# Patient Record
Sex: MALE | Race: ASIAN | NOT HISPANIC OR LATINO | URBAN - METROPOLITAN AREA
[De-identification: names, ages, dates, MRNs, and addresses within clinical notes are randomized per-mention and may not be internally consistent; named-entity substitution may affect disease eponyms.]

---

## 2017-08-07 PROBLEM — Z00.00 ENCOUNTER FOR PREVENTIVE HEALTH EXAMINATION: Status: ACTIVE | Noted: 2017-08-07

## 2017-08-09 ENCOUNTER — OUTPATIENT (OUTPATIENT)
Dept: OUTPATIENT SERVICES | Facility: HOSPITAL | Age: 72
LOS: 1 days | End: 2017-08-09
Payer: COMMERCIAL

## 2017-08-09 ENCOUNTER — RESULT REVIEW (OUTPATIENT)
Age: 72
End: 2017-08-09

## 2017-08-09 ENCOUNTER — APPOINTMENT (OUTPATIENT)
Dept: ULTRASOUND IMAGING | Facility: CLINIC | Age: 72
End: 2017-08-09
Payer: COMMERCIAL

## 2017-08-09 DIAGNOSIS — M25.469 EFFUSION, UNSPECIFIED KNEE: ICD-10-CM

## 2017-08-09 PROCEDURE — 20611 DRAIN/INJ JOINT/BURSA W/US: CPT

## 2017-08-09 PROCEDURE — 20611 DRAIN/INJ JOINT/BURSA W/US: CPT | Mod: RT

## 2017-09-13 ENCOUNTER — APPOINTMENT (OUTPATIENT)
Dept: SPINE | Facility: CLINIC | Age: 72
End: 2017-09-13
Payer: COMMERCIAL

## 2017-09-13 VITALS
WEIGHT: 160 LBS | OXYGEN SATURATION: 96 % | HEART RATE: 67 BPM | HEIGHT: 70 IN | SYSTOLIC BLOOD PRESSURE: 130 MMHG | BODY MASS INDEX: 22.9 KG/M2 | DIASTOLIC BLOOD PRESSURE: 71 MMHG

## 2017-09-13 DIAGNOSIS — Z86.39 PERSONAL HISTORY OF OTHER ENDOCRINE, NUTRITIONAL AND METABOLIC DISEASE: ICD-10-CM

## 2017-09-13 DIAGNOSIS — Z87.39 PERSONAL HISTORY OF OTHER DISEASES OF THE MUSCULOSKELETAL SYSTEM AND CONNECTIVE TISSUE: ICD-10-CM

## 2017-09-13 DIAGNOSIS — Z83.3 FAMILY HISTORY OF DIABETES MELLITUS: ICD-10-CM

## 2017-09-13 DIAGNOSIS — Z86.69 PERSONAL HISTORY OF OTHER DISEASES OF THE NERVOUS SYSTEM AND SENSE ORGANS: ICD-10-CM

## 2017-09-13 DIAGNOSIS — Z78.9 OTHER SPECIFIED HEALTH STATUS: ICD-10-CM

## 2017-09-13 DIAGNOSIS — Z82.5 FAMILY HISTORY OF ASTHMA AND OTHER CHRONIC LOWER RESPIRATORY DISEASES: ICD-10-CM

## 2017-09-13 PROCEDURE — 99204 OFFICE O/P NEW MOD 45 MIN: CPT

## 2017-09-15 PROBLEM — Z86.39 HISTORY OF HYPERCHOLESTEROLEMIA: Status: RESOLVED | Noted: 2017-09-15 | Resolved: 2017-09-15

## 2017-09-15 PROBLEM — Z86.69 HISTORY OF GLAUCOMA: Status: RESOLVED | Noted: 2017-09-15 | Resolved: 2017-09-15

## 2017-09-15 PROBLEM — Z86.39 HISTORY OF DIABETES MELLITUS: Status: RESOLVED | Noted: 2017-09-13 | Resolved: 2017-09-15

## 2017-09-15 PROBLEM — Z87.39 HISTORY OF ARTHRITIS: Status: RESOLVED | Noted: 2017-09-13 | Resolved: 2017-09-15

## 2017-09-15 PROBLEM — Z83.3 FAMILY HISTORY OF DIABETES MELLITUS: Status: ACTIVE | Noted: 2017-09-13

## 2017-09-15 PROBLEM — Z82.5 FAMILY HISTORY OF ASTHMA: Status: ACTIVE | Noted: 2017-09-13

## 2017-09-15 PROBLEM — Z78.9 DOES NOT USE ILLICIT DRUGS: Status: ACTIVE | Noted: 2017-09-13

## 2017-09-15 RX ORDER — METFORMIN HYDROCHLORIDE 500 MG/1
500 TABLET, COATED ORAL
Refills: 0 | Status: ACTIVE | COMMUNITY

## 2017-09-15 RX ORDER — INSULIN ASPART 100 [IU]/ML
100 INJECTION, SOLUTION INTRAVENOUS; SUBCUTANEOUS
Refills: 0 | Status: ACTIVE | COMMUNITY

## 2017-09-15 RX ORDER — LATANOPROST/PF 0.005 %
0.01 DROPS OPHTHALMIC (EYE)
Refills: 0 | Status: ACTIVE | COMMUNITY

## 2017-09-15 RX ORDER — INSULIN DETEMIR 100 [IU]/ML
100 INJECTION, SOLUTION SUBCUTANEOUS
Refills: 0 | Status: ACTIVE | COMMUNITY

## 2017-10-05 ENCOUNTER — APPOINTMENT (OUTPATIENT)
Dept: ORTHOPEDIC SURGERY | Facility: CLINIC | Age: 72
End: 2017-10-05
Payer: COMMERCIAL

## 2017-10-05 VITALS — HEIGHT: 71 IN | WEIGHT: 165 LBS | BODY MASS INDEX: 23.1 KG/M2

## 2017-10-05 VITALS — HEART RATE: 71 BPM | DIASTOLIC BLOOD PRESSURE: 73 MMHG | SYSTOLIC BLOOD PRESSURE: 151 MMHG

## 2017-10-05 PROCEDURE — 99203 OFFICE O/P NEW LOW 30 MIN: CPT

## 2017-10-05 PROCEDURE — 73564 X-RAY EXAM KNEE 4 OR MORE: CPT | Mod: LT

## 2017-11-27 ENCOUNTER — APPOINTMENT (OUTPATIENT)
Dept: ORTHOPEDIC SURGERY | Facility: CLINIC | Age: 72
End: 2017-11-27
Payer: MEDICARE

## 2017-11-27 VITALS
BODY MASS INDEX: 23.1 KG/M2 | WEIGHT: 165 LBS | SYSTOLIC BLOOD PRESSURE: 176 MMHG | HEART RATE: 76 BPM | HEIGHT: 71 IN | DIASTOLIC BLOOD PRESSURE: 71 MMHG

## 2017-11-27 DIAGNOSIS — M17.11 UNILATERAL PRIMARY OSTEOARTHRITIS, RIGHT KNEE: ICD-10-CM

## 2017-11-27 PROCEDURE — 99213 OFFICE O/P EST LOW 20 MIN: CPT

## 2018-01-10 ENCOUNTER — APPOINTMENT (OUTPATIENT)
Dept: ORTHOPEDIC SURGERY | Facility: CLINIC | Age: 73
End: 2018-01-10
Payer: MEDICARE

## 2018-01-10 VITALS — DIASTOLIC BLOOD PRESSURE: 78 MMHG | HEART RATE: 65 BPM | SYSTOLIC BLOOD PRESSURE: 163 MMHG

## 2018-01-10 PROCEDURE — 73030 X-RAY EXAM OF SHOULDER: CPT | Mod: LT

## 2018-01-10 PROCEDURE — 99214 OFFICE O/P EST MOD 30 MIN: CPT

## 2018-01-30 ENCOUNTER — FORM ENCOUNTER (OUTPATIENT)
Age: 73
End: 2018-01-30

## 2018-01-31 ENCOUNTER — OUTPATIENT (OUTPATIENT)
Dept: OUTPATIENT SERVICES | Facility: HOSPITAL | Age: 73
LOS: 1 days | End: 2018-01-31
Payer: COMMERCIAL

## 2018-01-31 ENCOUNTER — APPOINTMENT (OUTPATIENT)
Dept: SPINE | Facility: CLINIC | Age: 73
End: 2018-01-31
Payer: MEDICARE

## 2018-01-31 VITALS
BODY MASS INDEX: 22.54 KG/M2 | WEIGHT: 161 LBS | DIASTOLIC BLOOD PRESSURE: 77 MMHG | HEART RATE: 75 BPM | SYSTOLIC BLOOD PRESSURE: 163 MMHG | HEIGHT: 71 IN | OXYGEN SATURATION: 98 %

## 2018-01-31 DIAGNOSIS — Z87.39 PERSONAL HISTORY OF OTHER DISEASES OF THE MUSCULOSKELETAL SYSTEM AND CONNECTIVE TISSUE: ICD-10-CM

## 2018-01-31 PROCEDURE — 72110 X-RAY EXAM L-2 SPINE 4/>VWS: CPT

## 2018-01-31 PROCEDURE — 99214 OFFICE O/P EST MOD 30 MIN: CPT

## 2018-01-31 PROCEDURE — 72082 X-RAY EXAM ENTIRE SPI 2/3 VW: CPT

## 2018-01-31 PROCEDURE — 72084 X-RAY EXAM ENTIRE SPI 6/> VW: CPT | Mod: 26

## 2018-02-02 PROBLEM — Z87.39 HISTORY OF SPINAL STENOSIS: Status: RESOLVED | Noted: 2017-10-05 | Resolved: 2018-02-02

## 2018-02-02 PROBLEM — Z87.39 HISTORY OF RHEUMATOID ARTHRITIS: Status: RESOLVED | Noted: 2017-10-05 | Resolved: 2018-02-02

## 2018-02-14 ENCOUNTER — APPOINTMENT (OUTPATIENT)
Dept: MRI IMAGING | Facility: HOSPITAL | Age: 73
End: 2018-02-14
Payer: COMMERCIAL

## 2018-02-14 ENCOUNTER — OUTPATIENT (OUTPATIENT)
Dept: OUTPATIENT SERVICES | Facility: HOSPITAL | Age: 73
LOS: 1 days | End: 2018-02-14
Payer: MEDICARE

## 2018-02-14 DIAGNOSIS — M25.519 PAIN IN UNSPECIFIED SHOULDER: ICD-10-CM

## 2018-02-14 PROCEDURE — 73221 MRI JOINT UPR EXTREM W/O DYE: CPT

## 2018-02-14 PROCEDURE — 73221 MRI JOINT UPR EXTREM W/O DYE: CPT | Mod: 26,LT

## 2018-02-23 ENCOUNTER — APPOINTMENT (OUTPATIENT)
Dept: ORTHOPEDIC SURGERY | Facility: CLINIC | Age: 73
End: 2018-02-23
Payer: MEDICARE

## 2018-02-23 ENCOUNTER — APPOINTMENT (OUTPATIENT)
Dept: VASCULAR SURGERY | Facility: CLINIC | Age: 73
End: 2018-02-23

## 2018-02-23 DIAGNOSIS — Z82.62 FAMILY HISTORY OF OSTEOPOROSIS: ICD-10-CM

## 2018-02-23 PROCEDURE — 99213 OFFICE O/P EST LOW 20 MIN: CPT | Mod: 25

## 2018-02-23 PROCEDURE — 20610 DRAIN/INJ JOINT/BURSA W/O US: CPT | Mod: LT

## 2018-04-10 ENCOUNTER — MOBILE ON CALL (OUTPATIENT)
Age: 73
End: 2018-04-10

## 2018-08-13 ENCOUNTER — APPOINTMENT (OUTPATIENT)
Dept: ORTHOPEDIC SURGERY | Facility: CLINIC | Age: 73
End: 2018-08-13
Payer: MEDICARE

## 2018-08-13 DIAGNOSIS — M77.12 LATERAL EPICONDYLITIS, LEFT ELBOW: ICD-10-CM

## 2018-08-13 DIAGNOSIS — M17.12 UNILATERAL PRIMARY OSTEOARTHRITIS, LEFT KNEE: ICD-10-CM

## 2018-08-13 PROCEDURE — 99214 OFFICE O/P EST MOD 30 MIN: CPT

## 2018-08-13 PROCEDURE — 73564 X-RAY EXAM KNEE 4 OR MORE: CPT | Mod: LT

## 2018-10-01 ENCOUNTER — APPOINTMENT (OUTPATIENT)
Dept: ORTHOPEDIC SURGERY | Facility: CLINIC | Age: 73
End: 2018-10-01
Payer: MEDICARE

## 2018-10-01 PROCEDURE — 99213 OFFICE O/P EST LOW 20 MIN: CPT

## 2018-10-22 ENCOUNTER — APPOINTMENT (OUTPATIENT)
Dept: NEUROSURGERY | Facility: CLINIC | Age: 73
End: 2018-10-22
Payer: MEDICARE

## 2018-10-22 VITALS
HEIGHT: 71 IN | OXYGEN SATURATION: 99 % | WEIGHT: 158 LBS | HEART RATE: 69 BPM | RESPIRATION RATE: 18 BRPM | DIASTOLIC BLOOD PRESSURE: 68 MMHG | SYSTOLIC BLOOD PRESSURE: 131 MMHG | BODY MASS INDEX: 22.12 KG/M2

## 2018-10-22 DIAGNOSIS — M51.26 OTHER INTERVERTEBRAL DISC DISPLACEMENT, LUMBAR REGION: ICD-10-CM

## 2018-10-22 PROCEDURE — 99215 OFFICE O/P EST HI 40 MIN: CPT

## 2018-10-26 ENCOUNTER — APPOINTMENT (OUTPATIENT)
Dept: ORTHOPEDIC SURGERY | Facility: CLINIC | Age: 73
End: 2018-10-26
Payer: MEDICARE

## 2018-10-26 PROCEDURE — 20610 DRAIN/INJ JOINT/BURSA W/O US: CPT | Mod: LT

## 2018-10-26 PROCEDURE — 99213 OFFICE O/P EST LOW 20 MIN: CPT | Mod: 25

## 2019-05-29 ENCOUNTER — FORM ENCOUNTER (OUTPATIENT)
Age: 74
End: 2019-05-29

## 2019-05-30 ENCOUNTER — OUTPATIENT (OUTPATIENT)
Dept: OUTPATIENT SERVICES | Facility: HOSPITAL | Age: 74
LOS: 1 days | End: 2019-05-30
Payer: COMMERCIAL

## 2019-05-30 ENCOUNTER — APPOINTMENT (OUTPATIENT)
Dept: SPINE | Facility: CLINIC | Age: 74
End: 2019-05-30
Payer: MEDICARE

## 2019-05-30 VITALS
HEIGHT: 71 IN | BODY MASS INDEX: 21.28 KG/M2 | WEIGHT: 152 LBS | HEART RATE: 90 BPM | RESPIRATION RATE: 18 BRPM | DIASTOLIC BLOOD PRESSURE: 79 MMHG | OXYGEN SATURATION: 99 % | SYSTOLIC BLOOD PRESSURE: 149 MMHG

## 2019-05-30 DIAGNOSIS — M43.10 SPONDYLOLISTHESIS, SITE UNSPECIFIED: ICD-10-CM

## 2019-05-30 PROCEDURE — 99214 OFFICE O/P EST MOD 30 MIN: CPT

## 2019-05-30 PROCEDURE — 72110 X-RAY EXAM L-2 SPINE 4/>VWS: CPT

## 2019-05-30 PROCEDURE — 72084 X-RAY EXAM ENTIRE SPI 6/> VW: CPT | Mod: 26

## 2019-05-30 PROCEDURE — 72082 X-RAY EXAM ENTIRE SPI 2/3 VW: CPT

## 2019-05-30 RX ORDER — DICLOFENAC SODIUM 16.05 MG/ML
1.5 SOLUTION TOPICAL
Qty: 1 | Refills: 0 | Status: DISCONTINUED | COMMUNITY
Start: 2018-08-13 | End: 2019-05-30

## 2019-05-30 RX ORDER — DICLOFENAC SODIUM 16.05 MG/ML
1.5 SOLUTION TOPICAL
Qty: 1 | Refills: 0 | Status: DISCONTINUED | COMMUNITY
Start: 2018-01-10 | End: 2019-05-30

## 2019-05-31 ENCOUNTER — APPOINTMENT (OUTPATIENT)
Dept: ORTHOPEDIC SURGERY | Facility: CLINIC | Age: 74
End: 2019-05-31
Payer: MEDICARE

## 2019-05-31 DIAGNOSIS — M67.912 UNSPECIFIED DISORDER OF SYNOVIUM AND TENDON, LEFT SHOULDER: ICD-10-CM

## 2019-05-31 DIAGNOSIS — M75.112 INCOMPLETE ROTATOR CUFF TEAR OR RUPTURE OF LEFT SHOULDER, NOT SPECIFIED AS TRAUMATIC: ICD-10-CM

## 2019-05-31 PROCEDURE — 99213 OFFICE O/P EST LOW 20 MIN: CPT

## 2019-05-31 NOTE — PHYSICAL EXAM
[de-identified] : Physical Examination\par General: well nourished, in no acute distress, alert and oriented to person, place and time\par Psychiatric: normal mood and affect, no abnormal movements or speech patterns\par Eyes: vision intact without glasses\par Throat: no thyromegaly\par Lymph: no enlarged nodes, no lymphedema in extremity\par Respiratory: no wheezing, no shortness of breath with ambulation\par Cardiac: no cardiac leg swelling, 2+ peripheral pulses\par Neurology: normal gross sensation in extremities to light touch\par Abdomen: soft, non-tender, tympanic, no masses\par \par Musculoskeletal Examination\par Cervical spine	Full painless range of motion and negative Spurling's test\par \par Shoulder			Right			Left\par Appearance\par  Skin/Swelling/Deformity	normal			normal\par  Scapular Winging	-			-\par Range of Motion\par  Forward Flexion		170 / 170			170 / 170\par  Abduction		170 / 170			170 / 170\par  External Rotation	45			45\par  Internal Rotation		T10			T10\par  SAbd Ext Rotation	90			90\par  SAbd Int Rotation	80			80\par  Painful Arc		-			+\par  Crepitus		-			-\par Palpation\par  Clavicle		-			-\par  AC Joint		-			-\par  Post Acromion		-			-\par  Levator Scapula		-			-\par  Lateral Bursa		-			+\par  Impingement Area	-			-\par  Biceps Tendon		-			-\par  Anterior Capsule		-			-\par Strength Examination\par  Supraspinatous 		5+ / 0			5+ / +\par  Infraspinatous		5+ / 0			5+ / +\par  Subscapularis		5+ / 0			5+ / 0\par  Belly Press		5+ / 0			5+ / 0\par  Lift Off		-			-\par  Drop-Arm		-			-\par Special Examination\par  Biceps Newbury's		-			+ palm up\par  Impingement Neer	-			+\par  Impingement Hawking	-			+\par \par  Apprehension		-			-\par  Suppression		-			-\par  Anterior Subluxation	-			-\par  Posterior Subluxation	-			-\par  AC Cross-Body\par  Anterior		-			-\par  Posterior		-			-\par \par Sensation\par  Axillary		normal			normal\par  LatAntCubBrach 	normal			normal\par  Median 		normal			normal\par  Ulnar 			normal			normal\par  Radial 			normal			normal\par Motor\par  AIN 			normal			normal\par  Ulnar 			normal			normal\par  Radial 			normal			normal\par  PIN 			normal			normal\par Pulses\par  Radial			2+			2+. [id number] \par  [de-identified] : 4 views of the affected left shoulder demonstrate:\par  normal bony calcification without dislocation and no fracture\par 	Arch	2B\par 	AC Joint	no to mild DJD\par 	GH Joint	none DJD\par 	Calcifications	none\par \par \par MRI left shoulder from [facility] on February 14, 2018\par My impression of the images:\par Quality of the MRI is okay\par Supraspinatous Tendon high-grade tendinosis with a focal high-grade partial tear of the anterior most aspect of the supraspinatus tendon\par Infraspinatous Tendon high-grade tendinosis\par Subscapularis Tendon ok\par Teres Minor Tendon ok\par Muscle Belly Atrophy none\par Biceps Tendon is in the groove and looks okay intra-articularly but poorly visualized with a stable attachment anchor\par Superior Labrum possible tear\par Anterior Labrum ok\par Posterior Labrum ok\par AC joint edema in the bone and joint\par There is no full thickness chondral lesion of the glenoid and humeral head\par \par The Final Radiologist Impression:\par High-grade articular surface tearing at the anterior insertional fibers of the supraspinatus spanning 4 mm in the anterior-posterior dimension with a gap of less than 1 mm in the transverse dimension.\par Tearing of the anterior and inferior labrum\par Thickening of the glenohumeral joint capsule suggestive chronic adhesive capsulitis\par Moderate to severe a.c. joint arthrosis\par \par \par 5 views of the affected left knee (standing AP, flexing standing AP, 30degree flexed lateral, 0degree lateral, sunrise view) \par  demonstrate:\par There is mild to moderate medial weightbearing aasymmetric arrowing\par Traced small osteophytic lipping\par Trace suprapatellar effusion\par Trace patellofemoral joint space loss without evidence of tilt or subluxation on sunrise view\par Normal soft tissue density\par Otherwise normal osseous bone structure without fracture or dislocation. \par

## 2019-05-31 NOTE — HISTORY OF PRESENT ILLNESS
[de-identified] : CC left shoulder\par \par HPI 74 yo-year-old right HD presents for FU of chronic onset of one year of intermittent pain in the lateral and posterior left shoulder not related to an injury. The pain is worse, and rated a 6 out of 10, described as toll aching throbbing burning, radiation to the elbow. Nothing makes the pain better and overhead activity makes the pain worse. The patient reports associated symptoms of weakness. The patient + pain at night affecting sleep, - neck pain, and - similar pain previously.\par \par The patient has tried the following treatments:\par Activity modification	+\par Ice			+\par Nsaids			+\par Physical Therapy		+ mild help in past. didn’t go recently\par Cortisone Injection		+ reports multiple CSI by many doctors without complication, last CSI mild improvement for 3 months\par Arthroscopy/Surgery	-\par \par \par reports shoulder pain worsening, CSI mild improvement for 3 months\par \par \par Review of Systems is positive for the above musculoskeletal symptoms and is otherwise non-contributory for general, constitutional, psychiatric, neurologic, HEENT, cardiac, respiratory, gastrointestinal, reproductive, lymphatic, and dermatologic complaints.

## 2019-05-31 NOTE — DISCUSSION/SUMMARY
[de-identified] : left partial rotator cuff tear\par left knee mild osteoarthritis\par left tennis elbow\par \par \par We discussed at length the anatomy, function and tear pattern of the rotator cuff using models, diagrams and drawings. We reviewed the patient's physical exam, radiographic images and history. We discussed the expected outcome of non-operative conservative treatment versus arthroscopic operative rotator cuff repair. Non-operative management consists of patient education, activity modification, corticosteroid injection, PO or topical NSAIDs, and formal physical therapy. Partial thickness tears do have some limited healing potential but infrequently heal completely and with time sometimes progress towards a focal full thickness tear. Smaller tears without retraction are expected to progress and enlarge overtime to larger full thickness tears with retraction. Full thickness rotator cuff tears, in a vast majority of circumstances, do not heal without surgical treatment. The larger the tear becomes, the more difficult the repair is technically and protracts and slows rehabilitation. As the full thickness rotator cuff tear progresses over time, the torn tendon edge retracted due to intrinsic tendon changes to its strength and elasticity, which along with progressive rotator cuff muscle belly atrophy and fatty degeneration makes surgical management both less effective and more difficult. Given enough chronic tendon changes and degenerative muscle changes, the cuff may become unrepairable, necessitating larger, more costly, less predictable poorer outcome, reconstructive arthroscopic or open surgeries including a reverse shoulder replacement.\par \par patient not amenable to surgery.\par \par still hasn't done PT, rec PT\par \par no csi given only mild short term improvement\par \par FU after PT. \par

## 2019-06-10 ENCOUNTER — APPOINTMENT (OUTPATIENT)
Dept: ORTHOPEDIC SURGERY | Facility: CLINIC | Age: 74
End: 2019-06-10

## 2019-06-26 ENCOUNTER — APPOINTMENT (OUTPATIENT)
Dept: SPINE | Facility: CLINIC | Age: 74
End: 2019-06-26
Payer: MEDICARE

## 2019-06-26 VITALS
SYSTOLIC BLOOD PRESSURE: 139 MMHG | DIASTOLIC BLOOD PRESSURE: 75 MMHG | HEART RATE: 80 BPM | BODY MASS INDEX: 21.28 KG/M2 | OXYGEN SATURATION: 97 % | RESPIRATION RATE: 18 BRPM | WEIGHT: 152 LBS | HEIGHT: 71 IN

## 2019-06-26 DIAGNOSIS — M54.5 LOW BACK PAIN: ICD-10-CM

## 2019-06-26 DIAGNOSIS — M48.061 SPINAL STENOSIS, LUMBAR REGION WITHOUT NEUROGENIC CLAUDICATION: ICD-10-CM

## 2019-06-26 DIAGNOSIS — M43.10 SPONDYLOLISTHESIS, SITE UNSPECIFIED: ICD-10-CM

## 2019-06-26 DIAGNOSIS — M99.83 OTHER BIOMECHANICAL LESIONS OF LUMBAR REGION: ICD-10-CM

## 2019-06-26 DIAGNOSIS — M43.16 SPONDYLOLISTHESIS, LUMBAR REGION: ICD-10-CM

## 2019-06-26 PROCEDURE — 99215 OFFICE O/P EST HI 40 MIN: CPT

## 2019-06-27 PROBLEM — M43.16 SPONDYLOLISTHESIS OF LUMBAR REGION: Status: ACTIVE | Noted: 2019-06-27

## 2019-06-27 PROBLEM — M43.10 SPONDYLOLISTHESIS: Status: ACTIVE | Noted: 2019-06-27

## 2021-07-09 ENCOUNTER — TRANSCRIPTION ENCOUNTER (OUTPATIENT)
Age: 76
End: 2021-07-09

## 2021-07-09 ENCOUNTER — APPOINTMENT (OUTPATIENT)
Dept: VASCULAR SURGERY | Facility: CLINIC | Age: 76
End: 2021-07-09
Payer: MEDICARE

## 2021-07-09 VITALS — DIASTOLIC BLOOD PRESSURE: 72 MMHG | SYSTOLIC BLOOD PRESSURE: 132 MMHG | HEART RATE: 66 BPM

## 2021-07-09 PROCEDURE — 99203 OFFICE O/P NEW LOW 30 MIN: CPT

## 2021-07-09 RX ORDER — UBIDECARENONE/VIT E ACET 100MG-5
CAPSULE ORAL
Refills: 0 | Status: ACTIVE | COMMUNITY

## 2021-07-09 RX ORDER — FLUTICASONE PROPIONATE 50 UG/1
50 SPRAY, METERED NASAL
Refills: 0 | Status: DISCONTINUED | COMMUNITY
End: 2021-07-09

## 2021-07-09 RX ORDER — SIMVASTATIN 20 MG/1
20 TABLET, FILM COATED ORAL
Refills: 0 | Status: DISCONTINUED | COMMUNITY
End: 2021-07-09

## 2021-07-09 RX ORDER — GLIPIZIDE 5 MG/1
5 TABLET ORAL
Refills: 0 | Status: DISCONTINUED | COMMUNITY
End: 2021-07-09

## 2021-07-09 RX ORDER — CALCITONIN SALMON 2200 [IU]/ML
200 SPRAY, METERED NASAL
Refills: 0 | Status: DISCONTINUED | COMMUNITY
End: 2021-07-09

## 2021-07-09 RX ORDER — LISINOPRIL 20 MG/1
20 TABLET ORAL
Refills: 0 | Status: ACTIVE | COMMUNITY

## 2021-07-09 RX ORDER — QUINAPRIL HYDROCHLORIDE 20 MG/1
20 TABLET, FILM COATED ORAL
Refills: 0 | Status: DISCONTINUED | COMMUNITY
End: 2021-07-09

## 2021-07-09 RX ORDER — AMLODIPINE BESYLATE 2.5 MG/1
2.5 TABLET ORAL
Refills: 0 | Status: ACTIVE | COMMUNITY

## 2021-07-09 RX ORDER — ALENDRONATE SODIUM 70 MG/1
70 TABLET ORAL
Refills: 0 | Status: ACTIVE | COMMUNITY

## 2021-07-09 RX ORDER — ATORVASTATIN CALCIUM 40 MG/1
40 TABLET, FILM COATED ORAL
Refills: 0 | Status: ACTIVE | COMMUNITY

## 2021-07-09 NOTE — ASSESSMENT
[FreeTextEntry1] : 75 yo male with history of htn, hld, dm, spinal stenosis with chronic back pain presents for evaluation of "circulation issues" referred by podiatry after arterial duplex shows plaque in the pop artery.\par \par pt with strongly palpable pulses of b/l lower extremities \par no open wounds or rest pain \par no vascular surgical intervention \par pt to follow up as needed

## 2021-07-09 NOTE — HISTORY OF PRESENT ILLNESS
[FreeTextEntry1] : 75 yo male with history of htn, hld, dm, spinal stenosis with chronic back pain presents for evaluation of "circulation issues" refereed by podiatry after arterial duplex shows plaque in the pop artery.  pt states that his walking is limited 2/2 back and knee pain and is able to walk about 2-3 blocks prior to having to stop.  pt states that he had a lower extremity wound about 4 years ago that healed with local wound care and denies any recurrent wounds since then.  pt denies any pain in the feet while resting

## 2021-07-09 NOTE — CONSULT LETTER
[Dear  ___] : Dear  [unfilled], [Consult Letter:] : I had the pleasure of evaluating your patient, [unfilled]. [Please see my note below.] : Please see my note below. [Consult Closing:] : Thank you very much for allowing me to participate in the care of this patient.  If you have any questions, please do not hesitate to contact me. [Sincerely,] : Sincerely, [FreeTextEntry3] : Shorty Perry M.D., F.EMMAS., R.P.WENDY.I.\par  of Vascular Surgery\par Assistant Professor of Radiology\par Director of Endovascular Program/ [facility] Center\par [facility]

## 2021-07-09 NOTE — PHYSICAL EXAM
[Abdominal Aorta] : Normal abdominal aorta [2+] : left 2+ [No Rash or Lesion] : No rash or lesion [Alert] : alert [Calm] : calm [JVD] : no jugular venous distention  [Normal Breath Sounds] : Normal breath sounds [Normal Heart Sounds] : normal heart sounds [Ankle Swelling (On Exam)] : not present [Varicose Veins Of Lower Extremities] : not present [] : not present [Abdomen Masses] : No abdominal masses [Skin Ulcer] : no ulcer [de-identified] : appears well  [de-identified] : sensation intact b/l lower extremities

## 2021-12-12 ENCOUNTER — APPOINTMENT (OUTPATIENT)
Dept: DISASTER EMERGENCY | Facility: CLINIC | Age: 76
End: 2021-12-12

## 2021-12-12 DIAGNOSIS — Z01.818 ENCOUNTER FOR OTHER PREPROCEDURAL EXAMINATION: ICD-10-CM

## 2021-12-13 ENCOUNTER — OUTPATIENT (OUTPATIENT)
Dept: OUTPATIENT SERVICES | Facility: HOSPITAL | Age: 76
LOS: 1 days | End: 2021-12-13
Payer: COMMERCIAL

## 2021-12-13 VITALS
RESPIRATION RATE: 18 BRPM | OXYGEN SATURATION: 97 % | WEIGHT: 158.95 LBS | HEART RATE: 72 BPM | DIASTOLIC BLOOD PRESSURE: 77 MMHG | SYSTOLIC BLOOD PRESSURE: 164 MMHG | HEIGHT: 69 IN | TEMPERATURE: 98 F

## 2021-12-13 DIAGNOSIS — M65.311 TRIGGER THUMB, RIGHT THUMB: ICD-10-CM

## 2021-12-13 DIAGNOSIS — I10 ESSENTIAL (PRIMARY) HYPERTENSION: ICD-10-CM

## 2021-12-13 DIAGNOSIS — E11.9 TYPE 2 DIABETES MELLITUS WITHOUT COMPLICATIONS: ICD-10-CM

## 2021-12-13 DIAGNOSIS — Z91.89 OTHER SPECIFIED PERSONAL RISK FACTORS, NOT ELSEWHERE CLASSIFIED: ICD-10-CM

## 2021-12-13 DIAGNOSIS — Z01.818 ENCOUNTER FOR OTHER PREPROCEDURAL EXAMINATION: ICD-10-CM

## 2021-12-13 LAB — SARS-COV-2 N GENE NPH QL NAA+PROBE: NOT DETECTED

## 2021-12-13 PROCEDURE — 71046 X-RAY EXAM CHEST 2 VIEWS: CPT | Mod: 26

## 2021-12-13 PROCEDURE — 71046 X-RAY EXAM CHEST 2 VIEWS: CPT

## 2021-12-13 PROCEDURE — G0463: CPT

## 2021-12-13 RX ORDER — ALENDRONATE SODIUM 70 MG/1
1 TABLET ORAL
Qty: 0 | Refills: 0 | DISCHARGE

## 2021-12-13 RX ORDER — ATORVASTATIN CALCIUM 80 MG/1
1 TABLET, FILM COATED ORAL
Qty: 0 | Refills: 0 | DISCHARGE

## 2021-12-13 RX ORDER — INSULIN DETEMIR 100/ML (3)
25 INSULIN PEN (ML) SUBCUTANEOUS
Qty: 0 | Refills: 0 | DISCHARGE

## 2021-12-13 RX ORDER — ASPIRIN/CALCIUM CARB/MAGNESIUM 324 MG
0 TABLET ORAL
Qty: 0 | Refills: 0 | DISCHARGE

## 2021-12-13 RX ORDER — LISINOPRIL 2.5 MG/1
1 TABLET ORAL
Qty: 0 | Refills: 0 | DISCHARGE

## 2021-12-13 RX ORDER — METFORMIN HYDROCHLORIDE 850 MG/1
1 TABLET ORAL
Qty: 0 | Refills: 0 | DISCHARGE

## 2021-12-13 RX ORDER — INSULIN ASPART 100 [IU]/ML
15 INJECTION, SOLUTION SUBCUTANEOUS
Qty: 0 | Refills: 0 | DISCHARGE

## 2021-12-13 RX ORDER — AMLODIPINE BESYLATE 2.5 MG/1
1 TABLET ORAL
Qty: 0 | Refills: 0 | DISCHARGE

## 2021-12-13 NOTE — H&P PST ADULT - GASTROINTESTINAL
<<-----Click on this checkbox to enter Post-Op Dx Soft, non-tender, no hepatosplenomegaly, normal bowel sounds details…

## 2021-12-13 NOTE — H&P PST ADULT - PROBLEM SELECTOR PLAN 1
Patient scheduled for right thumb tenosynovectomy on 12/15/2021  Written and oral preoperative instructions given to patient with understanding verbalized.   Instructions given to include using 4% chlorhexidine gluconate pre procedural wash day of surgery prior to hospital arrival  Maintaining NPO status post midnight day before surgery  Stopping aspirin, NSAIDs, herbs, vitamins 7days before surgery   Patient is to expect a phone call day before surgery between the hours of 430- 630pm giving arrival time for surgery day.

## 2021-12-13 NOTE — H&P PST ADULT - ACTIVITY
Daily walks, ADLs, able to walk up 3-4flights of stairs slowly w/o exertional chest pain or sob but with back pain

## 2021-12-13 NOTE — H&P PST ADULT - PROBLEM SELECTOR PLAN 3
Current treatment regimen metformin 500mg bid, Levemir 25units at HS and Novolog 15units bid. Advised to c/w regimen  Patient instructed with understanding verbalized to HOLD metformin and Novolog the day of surgery but is to take regular Levemir 25units night before surgery.  Last Hgb A1C not known, will obtain from PCP  Fingerstick STAT AM day of surgery ordered  Follow up with PCP postoperatively. Current treatment regimen Amlodipine 25mg and lisinopril 20mg daily. Advised to c/w regimen.   Patient instructed with understanding verbalized to take amlodipine and lisinopril with a sip of water the morning of surgery.   Follow up with PCP postoperatively

## 2021-12-13 NOTE — H&P PST ADULT - PROBLEM SELECTOR PLAN 4
Current treatment regimen metformin 500mg bid, Levemir 25units at HS and Novolog 15units bid. Advised to c/w regimen  Patient instructed with understanding verbalized to HOLD metformin and Novolog the day of surgery but is to take regular Levemir 25units night before surgery.  Last Hgb A1C not known, will obtain from PCP  Fingerstick STAT AM day of surgery ordered  Follow up with PCP postoperatively.

## 2021-12-13 NOTE — H&P PST ADULT - HISTORY OF PRESENT ILLNESS
76year old male with pmhx of T2DM, hypertension, hyperlipidemia, osteoporosis, osteoarthritis of knees and shoulders and lumbar stenosis presents with c/o right thumb pain x 1year that has failed to resolve with conservative management. Patient is here today for presurgical testing for scheduled right thumb tenosynovectomy on 12/15/2021

## 2021-12-13 NOTE — H&P PST ADULT - NSANTHOSAYNRD_GEN_A_CORE
No. JONAS screening performed.  STOP BANG Legend: 0-2 = LOW Risk; 3-4 = INTERMEDIATE Risk; 5-8 = HIGH Risk

## 2021-12-13 NOTE — H&P PST ADULT - NSICDXPASTMEDICALHX_GEN_ALL_CORE_FT
PAST MEDICAL HISTORY:  History of osteoporosis     Hyperlipidemia     Hypertension     Lumbar stenosis     Osteoarthritis     Type 2 diabetes mellitus

## 2021-12-13 NOTE — H&P PST ADULT - PROBLEM SELECTOR PLAN 2
Current treatment regimen Amlodipine 25mg and lisinopril 20mg daily. Advised to c/w regimen.   Patient instructed with understanding verbalized to take amlodipine and lisinopril with a sip of water the morning of surgery.   Follow up with PCP postoperatively Patient today with Stop Bang Score of 4, Intermediate risk for JONAS   Patient denies current dx of hx of JONAS  Recommend perioperative JONAS risk precautions

## 2022-12-27 ENCOUNTER — NON-APPOINTMENT (OUTPATIENT)
Age: 77
End: 2022-12-27

## 2022-12-27 PROBLEM — M48.061 SPINAL STENOSIS, LUMBAR REGION WITHOUT NEUROGENIC CLAUDICATION: Chronic | Status: ACTIVE | Noted: 2021-12-13

## 2022-12-27 PROBLEM — M19.90 UNSPECIFIED OSTEOARTHRITIS, UNSPECIFIED SITE: Chronic | Status: ACTIVE | Noted: 2021-12-13

## 2022-12-27 PROBLEM — Z87.39 PERSONAL HISTORY OF OTHER DISEASES OF THE MUSCULOSKELETAL SYSTEM AND CONNECTIVE TISSUE: Chronic | Status: ACTIVE | Noted: 2021-12-13

## 2022-12-27 PROBLEM — E11.9 TYPE 2 DIABETES MELLITUS WITHOUT COMPLICATIONS: Chronic | Status: ACTIVE | Noted: 2021-12-13

## 2022-12-27 PROBLEM — E78.5 HYPERLIPIDEMIA, UNSPECIFIED: Chronic | Status: ACTIVE | Noted: 2021-12-13

## 2022-12-27 PROBLEM — I10 ESSENTIAL (PRIMARY) HYPERTENSION: Chronic | Status: ACTIVE | Noted: 2021-12-13

## 2023-01-06 ENCOUNTER — APPOINTMENT (OUTPATIENT)
Dept: VASCULAR SURGERY | Facility: CLINIC | Age: 78
End: 2023-01-06
Payer: MEDICARE

## 2023-01-06 VITALS
TEMPERATURE: 97.5 F | HEIGHT: 71 IN | BODY MASS INDEX: 21.7 KG/M2 | HEART RATE: 80 BPM | SYSTOLIC BLOOD PRESSURE: 147 MMHG | DIASTOLIC BLOOD PRESSURE: 75 MMHG | WEIGHT: 155 LBS

## 2023-01-06 PROCEDURE — 99214 OFFICE O/P EST MOD 30 MIN: CPT

## 2023-01-06 NOTE — PHYSICAL EXAM
[JVD] : no jugular venous distention  [Normal Breath Sounds] : Normal breath sounds [Normal Heart Sounds] : normal heart sounds [Abdominal Aorta] : Normal abdominal aorta [2+] : left 2+ [Ankle Swelling (On Exam)] : not present [Varicose Veins Of Lower Extremities] : not present [] : not present [Abdomen Masses] : No abdominal masses [No Rash or Lesion] : No rash or lesion [Skin Ulcer] : no ulcer [Alert] : alert [Calm] : calm [de-identified] : appears well  [de-identified] : sensation intact b/l lower extremities

## 2023-01-06 NOTE — ASSESSMENT
[FreeTextEntry1] : 77 yo male with history of htn, hld, dm, spinal stenosis with chronic back pain presents for evaluation of "circulation issues" referred by podiatry after arterial duplex shows plaque in the pop artery.\par \par pt with strongly palpable pulses of b/l lower extremities \par We will schedule the patient for PVR with exercise to document adequate flow.  Patient continues to have symptoms and there is a concern regarding circulation.\par \par

## 2023-01-10 ENCOUNTER — TRANSCRIPTION ENCOUNTER (OUTPATIENT)
Age: 78
End: 2023-01-10

## 2023-01-11 ENCOUNTER — APPOINTMENT (OUTPATIENT)
Dept: VASCULAR SURGERY | Facility: CLINIC | Age: 78
End: 2023-01-11
Payer: MEDICARE

## 2023-01-11 PROCEDURE — 93971 EXTREMITY STUDY: CPT | Mod: LT

## 2023-01-11 PROCEDURE — 93924 LWR XTR VASC STDY BILAT: CPT

## 2023-01-23 ENCOUNTER — TRANSCRIPTION ENCOUNTER (OUTPATIENT)
Age: 78
End: 2023-01-23

## 2023-01-24 ENCOUNTER — NON-APPOINTMENT (OUTPATIENT)
Age: 78
End: 2023-01-24

## 2023-01-26 ENCOUNTER — TRANSCRIPTION ENCOUNTER (OUTPATIENT)
Age: 78
End: 2023-01-26

## 2024-01-02 ENCOUNTER — APPOINTMENT (OUTPATIENT)
Dept: ORTHOPEDIC SURGERY | Facility: CLINIC | Age: 79
End: 2024-01-02

## 2024-01-23 ENCOUNTER — APPOINTMENT (OUTPATIENT)
Dept: ORTHOPEDIC SURGERY | Facility: CLINIC | Age: 79
End: 2024-01-23
Payer: MEDICARE

## 2024-01-23 DIAGNOSIS — M65.321 TRIGGER FINGER, RIGHT INDEX FINGER: ICD-10-CM

## 2024-01-23 PROCEDURE — 99203 OFFICE O/P NEW LOW 30 MIN: CPT | Mod: 25

## 2024-01-23 PROCEDURE — 20550 NJX 1 TENDON SHEATH/LIGAMENT: CPT | Mod: RT

## 2024-01-23 PROCEDURE — 73140 X-RAY EXAM OF FINGER(S): CPT | Mod: RT

## 2024-01-23 NOTE — HISTORY OF PRESENT ILLNESS
[de-identified] : 1/23/24: 78yo RHD male presents for RIGHT index finger pain and locking x 1 year. Worse in the morning. Reports that he underwent CSI for RIGHT index trigger finger by Dr. Dieter Ramachandran in March 2023, which helped for 6 months. Denies injury.  Hx: IDDM. HTN. HLD. Lumbar stenosis. [] : no [FreeTextEntry5] : CRESCENCIO lynn [RHD] 79 year old male is here today c/o RIGHT index finger locking and pain x 1 year w/o injury. c/o increased symptoms in the AM. +CSI which wore off after 6 months.  [FreeTextEntry1] : RIGHT index finger  [de-identified] : outside provider  [de-identified] : 6 months ago

## 2024-01-23 NOTE — ASSESSMENT
[FreeTextEntry1] : The condition was explained to the patient. Discussed risks and benefits of treatment options for tenosynovitis - activity modification, NSAID, splint, steroid injection, or surgery.  Patient would like to proceed with CSI for trigger finger. - Discussed risks, benefits, and alternatives as well as contents of injection. Risks include, but are not limited allergic reaction, flare reaction, injection site pain, bruising, numbness, increased blood sugar, skin discoloration, fat atrophy, tendon rupture, and infection. Risk of immune suppression and increased susceptibility to infection with steroid use. We discussed that too many injections may lead to weakening of the tendon and tendon rupture. Patient expressed understanding and would like to proceed with injection. - The skin over the RIGHT index finger A1 pulley was cleansed with alcohol and anesthetized with ethyl chloride. The flexor sheath was injected with 3mg of celestone, 0.5cc of 1% lidocaine. Site was dressed with a band-aid. Patient tolerated the procedure well. - discussed that it may take up to 1 week for symptoms to improve after CSI.  F/u PRN.

## 2024-01-23 NOTE — IMAGING
[de-identified] : RIGHT HAND skin intact. no swelling. min TTP to IF volar palmodigital crease. good EPL, FPL. good finger extension, flex to full fist. good finger abduction and adduction.  SILT median, ulnar, radial distributions. palpable radial pulse, brisk cap refill all digits. no triggering.   XRAYS OF RIGHT INDEX FINGER: no acute displaced fracture or dislocation. djd of DIPJ.

## 2024-07-11 ENCOUNTER — NON-APPOINTMENT (OUTPATIENT)
Age: 79
End: 2024-07-11

## 2024-07-12 ENCOUNTER — APPOINTMENT (OUTPATIENT)
Age: 79
End: 2024-07-12
Payer: MEDICARE

## 2024-07-12 VITALS
DIASTOLIC BLOOD PRESSURE: 70 MMHG | OXYGEN SATURATION: 99 % | BODY MASS INDEX: 21.7 KG/M2 | HEART RATE: 82 BPM | WEIGHT: 155 LBS | HEIGHT: 71 IN | SYSTOLIC BLOOD PRESSURE: 142 MMHG

## 2024-07-12 DIAGNOSIS — M25.562 PAIN IN LEFT KNEE: ICD-10-CM

## 2024-07-12 PROCEDURE — 73562 X-RAY EXAM OF KNEE 3: CPT | Mod: LT

## 2024-07-12 PROCEDURE — 99204 OFFICE O/P NEW MOD 45 MIN: CPT | Mod: 25

## 2024-07-12 PROCEDURE — 20610 DRAIN/INJ JOINT/BURSA W/O US: CPT | Mod: LT

## 2024-07-12 RX ORDER — MELOXICAM 7.5 MG/1
7.5 TABLET ORAL
Qty: 28 | Refills: 1 | Status: ACTIVE | COMMUNITY
Start: 2024-07-12 | End: 1900-01-01

## 2024-07-12 RX ORDER — DICLOFENAC SODIUM 1% 10 MG/G
1 GEL TOPICAL DAILY
Qty: 1 | Refills: 1 | Status: ACTIVE | COMMUNITY
Start: 2024-07-12 | End: 1900-01-01

## 2024-09-20 ENCOUNTER — TRANSCRIPTION ENCOUNTER (OUTPATIENT)
Age: 79
End: 2024-09-20

## 2024-10-04 ENCOUNTER — APPOINTMENT (OUTPATIENT)
Age: 79
End: 2024-10-04
Payer: MEDICARE

## 2024-10-04 DIAGNOSIS — M25.562 PAIN IN LEFT KNEE: ICD-10-CM

## 2024-10-04 PROCEDURE — 99213 OFFICE O/P EST LOW 20 MIN: CPT

## 2024-10-04 RX ORDER — DICLOFENAC SODIUM 10 MG/G
1 GEL TOPICAL DAILY
Qty: 1 | Refills: 1 | Status: ACTIVE | COMMUNITY
Start: 2024-10-04 | End: 1900-01-01

## 2024-10-04 RX ORDER — HYLAN G-F 20 16MG/2ML
16 SYRINGE (ML) INTRAARTICULAR
Qty: 1 | Refills: 0 | Status: ACTIVE | COMMUNITY
Start: 2024-10-04 | End: 1900-01-01

## 2024-10-04 NOTE — PHYSICAL EXAM
[de-identified] : The patient is alert, cooperative, and oriented x 3. Pupils are equal and round. The patient does not have skin rash. Gait is normal.  Left knee: no redness, no clear swelling, ROM full, pain with max flexion, no clear instability, David test negative. [de-identified] : Left knee x-ray 3 views taken today show mild osteoarthritis of left knee joint. PF joint looks ok.

## 2024-10-04 NOTE — PHYSICAL EXAM
[de-identified] : The patient is alert, cooperative, and oriented x 3. Pupils are equal and round. The patient does not have skin rash. Gait is normal.  Left knee: no redness, no clear swelling, ROM full, pain with max flexion, no clear instability, David test negative. [de-identified] : Left knee x-ray 3 views taken today show mild osteoarthritis of left knee joint. PF joint looks ok.

## 2024-10-04 NOTE — CONSULT LETTER
[Dear  ___] : Dear  [unfilled], [Consult Letter:] : I had the pleasure of evaluating your patient, [unfilled]. [Please see my note below.] : Please see my note below. [Consult Closing:] : Thank you very much for allowing me to participate in the care of this patient.  If you have any questions, please do not hesitate to contact me. [Sincerely,] : Sincerely, [FreeTextEntry3] : Angie Locke MD PhD

## 2024-10-04 NOTE — HISTORY OF PRESENT ILLNESS
[de-identified] : Follow-up of left knee pain He states his left knee pain is gone after left knee joint injection at the previous visit. He also did PT many times. But left knee pain came back recently.  Below is the history of initial visit. He is 79 years old male who comes to clinic with left knee pain. He has chronic left knee pain for 7-8 years which got worsened one month ago after walking on uneven floor. No pain meds. He did PT 5 years ago. He had GEL injection 5 years ago.

## 2024-10-04 NOTE — DISCUSSION/SUMMARY
[de-identified] : I examined, evaluated, and discussed with the patient. His left knee pain was gone after left knee steroid injection. He also did PT but recently left knee pain came back.  Based on physical exam and image findings, the patient diagnosis is left knee osteoarthritis.  Treatment plan is medications PRN and PT and hyaluronic acid injection to left knee joint. Synvisc ordered. Also Meloxicam ordered.   The patient understands everything and all questions were answered. The patient will return after Synvisc approved.

## 2024-10-04 NOTE — HISTORY OF PRESENT ILLNESS
[de-identified] : Follow-up of left knee pain He states his left knee pain is gone after left knee joint injection at the previous visit. He also did PT many times. But left knee pain came back recently.  Below is the history of initial visit. He is 79 years old male who comes to clinic with left knee pain. He has chronic left knee pain for 7-8 years which got worsened one month ago after walking on uneven floor. No pain meds. He did PT 5 years ago. He had GEL injection 5 years ago.

## 2024-10-04 NOTE — DISCUSSION/SUMMARY
[de-identified] : I examined, evaluated, and discussed with the patient. His left knee pain was gone after left knee steroid injection. He also did PT but recently left knee pain came back.  Based on physical exam and image findings, the patient diagnosis is left knee osteoarthritis.  Treatment plan is medications PRN and PT and hyaluronic acid injection to left knee joint. Synvisc ordered. Also Meloxicam ordered.   The patient understands everything and all questions were answered. The patient will return after Synvisc approved.

## 2024-10-08 RX ORDER — MELOXICAM 7.5 MG/1
7.5 TABLET ORAL
Qty: 30 | Refills: 1 | Status: ACTIVE | COMMUNITY
Start: 2024-10-04 | End: 1900-01-01

## 2025-09-13 ENCOUNTER — EMERGENCY (EMERGENCY)
Facility: HOSPITAL | Age: 80
LOS: 1 days | End: 2025-09-13
Attending: STUDENT IN AN ORGANIZED HEALTH CARE EDUCATION/TRAINING PROGRAM
Payer: COMMERCIAL

## 2025-09-13 VITALS
SYSTOLIC BLOOD PRESSURE: 183 MMHG | TEMPERATURE: 98 F | RESPIRATION RATE: 16 BRPM | WEIGHT: 154.1 LBS | HEART RATE: 67 BPM | HEIGHT: 71 IN | OXYGEN SATURATION: 100 % | DIASTOLIC BLOOD PRESSURE: 77 MMHG

## 2025-09-13 PROCEDURE — 99285 EMERGENCY DEPT VISIT HI MDM: CPT

## 2025-09-13 PROCEDURE — 74177 CT ABD & PELVIS W/CONTRAST: CPT | Mod: 26

## 2025-09-13 PROCEDURE — 74177 CT ABD & PELVIS W/CONTRAST: CPT

## 2025-09-13 RX ORDER — ACETAMINOPHEN 500 MG/5ML
1000 LIQUID (ML) ORAL ONCE
Refills: 0 | Status: COMPLETED | OUTPATIENT
Start: 2025-09-13 | End: 2025-09-13

## 2025-09-13 RX ORDER — SODIUM CHLORIDE 9 G/1000ML
1000 INJECTION, SOLUTION INTRAVENOUS ONCE
Refills: 0 | Status: COMPLETED | OUTPATIENT
Start: 2025-09-13 | End: 2025-09-13

## 2025-09-13 RX ADMIN — SODIUM CHLORIDE 1000 MILLILITER(S): 9 INJECTION, SOLUTION INTRAVENOUS at 23:46

## 2025-09-13 RX ADMIN — Medication 400 MILLIGRAM(S): at 23:30

## 2025-09-14 VITALS
RESPIRATION RATE: 18 BRPM | DIASTOLIC BLOOD PRESSURE: 78 MMHG | SYSTOLIC BLOOD PRESSURE: 142 MMHG | HEART RATE: 68 BPM | OXYGEN SATURATION: 99 % | TEMPERATURE: 98 F

## 2025-09-14 LAB
ALBUMIN SERPL ELPH-MCNC: 4 G/DL — SIGNIFICANT CHANGE UP (ref 3.3–5)
ALP SERPL-CCNC: 70 U/L — SIGNIFICANT CHANGE UP (ref 40–120)
ALT FLD-CCNC: 20 U/L — SIGNIFICANT CHANGE UP (ref 10–45)
ANION GAP SERPL CALC-SCNC: 12 MMOL/L — SIGNIFICANT CHANGE UP (ref 5–17)
ANION GAP SERPL CALC-SCNC: 14 MMOL/L — SIGNIFICANT CHANGE UP (ref 5–17)
APPEARANCE UR: ABNORMAL
AST SERPL-CCNC: 27 U/L — SIGNIFICANT CHANGE UP (ref 10–40)
BACTERIA # UR AUTO: NEGATIVE /HPF — SIGNIFICANT CHANGE UP
BILIRUB SERPL-MCNC: 1.2 MG/DL — SIGNIFICANT CHANGE UP (ref 0.2–1.2)
BILIRUB UR-MCNC: NEGATIVE — SIGNIFICANT CHANGE UP
BUN SERPL-MCNC: 19 MG/DL — SIGNIFICANT CHANGE UP (ref 7–23)
BUN SERPL-MCNC: 20 MG/DL — SIGNIFICANT CHANGE UP (ref 7–23)
CA CARBONATE CRY # UR COMP ASSIST: PRESENT
CALCIUM SERPL-MCNC: 8.6 MG/DL — SIGNIFICANT CHANGE UP (ref 8.4–10.5)
CALCIUM SERPL-MCNC: 8.9 MG/DL — SIGNIFICANT CHANGE UP (ref 8.4–10.5)
CAST: 1 /LPF — SIGNIFICANT CHANGE UP (ref 0–4)
CHLORIDE SERPL-SCNC: 102 MMOL/L — SIGNIFICANT CHANGE UP (ref 96–108)
CHLORIDE SERPL-SCNC: 97 MMOL/L — SIGNIFICANT CHANGE UP (ref 96–108)
CO2 SERPL-SCNC: 21 MMOL/L — LOW (ref 22–31)
CO2 SERPL-SCNC: 23 MMOL/L — SIGNIFICANT CHANGE UP (ref 22–31)
COLOR SPEC: YELLOW — SIGNIFICANT CHANGE UP
CREAT SERPL-MCNC: 1.39 MG/DL — HIGH (ref 0.5–1.3)
CREAT SERPL-MCNC: 1.49 MG/DL — HIGH (ref 0.5–1.3)
DIFF PNL FLD: ABNORMAL
EGFR: 48 ML/MIN/1.73M2 — LOW
EGFR: 48 ML/MIN/1.73M2 — LOW
EGFR: 52 ML/MIN/1.73M2 — LOW
EGFR: 52 ML/MIN/1.73M2 — LOW
GLUCOSE SERPL-MCNC: 113 MG/DL — HIGH (ref 70–99)
GLUCOSE SERPL-MCNC: 148 MG/DL — HIGH (ref 70–99)
GLUCOSE UR QL: NEGATIVE MG/DL — SIGNIFICANT CHANGE UP
HCT VFR BLD CALC: 39.4 % — SIGNIFICANT CHANGE UP (ref 39–50)
HGB BLD-MCNC: 13 G/DL — SIGNIFICANT CHANGE UP (ref 13–17)
KETONES UR QL: NEGATIVE MG/DL — SIGNIFICANT CHANGE UP
LEUKOCYTE ESTERASE UR-ACNC: NEGATIVE — SIGNIFICANT CHANGE UP
LIDOCAIN IGE QN: 20 U/L — SIGNIFICANT CHANGE UP (ref 7–60)
MAGNESIUM SERPL-MCNC: 1.9 MG/DL — SIGNIFICANT CHANGE UP (ref 1.6–2.6)
MCHC RBC-ENTMCNC: 30.3 PG — SIGNIFICANT CHANGE UP (ref 27–34)
MCHC RBC-ENTMCNC: 33 G/DL — SIGNIFICANT CHANGE UP (ref 32–36)
MCV RBC AUTO: 91.8 FL — SIGNIFICANT CHANGE UP (ref 80–100)
NITRITE UR-MCNC: NEGATIVE — SIGNIFICANT CHANGE UP
NRBC # BLD AUTO: 0 K/UL — SIGNIFICANT CHANGE UP (ref 0–0)
NRBC # FLD: 0 K/UL — SIGNIFICANT CHANGE UP (ref 0–0)
NRBC BLD AUTO-RTO: 0 /100 WBCS — SIGNIFICANT CHANGE UP (ref 0–0)
PH UR: 5.5 — SIGNIFICANT CHANGE UP (ref 5–8)
PLATELET # BLD AUTO: 207 K/UL — SIGNIFICANT CHANGE UP (ref 150–400)
PMV BLD: 10.5 FL — SIGNIFICANT CHANGE UP (ref 7–13)
POTASSIUM SERPL-MCNC: 4.1 MMOL/L — SIGNIFICANT CHANGE UP (ref 3.5–5.3)
POTASSIUM SERPL-MCNC: 4.4 MMOL/L — SIGNIFICANT CHANGE UP (ref 3.5–5.3)
POTASSIUM SERPL-SCNC: 4.1 MMOL/L — SIGNIFICANT CHANGE UP (ref 3.5–5.3)
POTASSIUM SERPL-SCNC: 4.4 MMOL/L — SIGNIFICANT CHANGE UP (ref 3.5–5.3)
PROT SERPL-MCNC: 6.5 G/DL — SIGNIFICANT CHANGE UP (ref 6–8.3)
PROT UR-MCNC: NEGATIVE MG/DL — SIGNIFICANT CHANGE UP
RBC # BLD: 4.29 M/UL — SIGNIFICANT CHANGE UP (ref 4.2–5.8)
RBC # FLD: 13.1 % — SIGNIFICANT CHANGE UP (ref 10.3–14.5)
RBC CASTS # UR COMP ASSIST: 2 /HPF — SIGNIFICANT CHANGE UP (ref 0–4)
REVIEW: SIGNIFICANT CHANGE UP
SODIUM SERPL-SCNC: 132 MMOL/L — LOW (ref 135–145)
SODIUM SERPL-SCNC: 137 MMOL/L — SIGNIFICANT CHANGE UP (ref 135–145)
SP GR SPEC: 1.01 — SIGNIFICANT CHANGE UP (ref 1–1.03)
SQUAMOUS # UR AUTO: 0 /HPF — SIGNIFICANT CHANGE UP (ref 0–5)
UROBILINOGEN FLD QL: 0.2 MG/DL — SIGNIFICANT CHANGE UP (ref 0.2–1)
WBC # BLD: 7.03 K/UL — SIGNIFICANT CHANGE UP (ref 3.8–10.5)
WBC # FLD AUTO: 7.03 K/UL — SIGNIFICANT CHANGE UP (ref 3.8–10.5)
WBC UR QL: 2 /HPF — SIGNIFICANT CHANGE UP (ref 0–5)

## 2025-09-14 PROCEDURE — 85027 COMPLETE CBC AUTOMATED: CPT

## 2025-09-14 PROCEDURE — 80048 BASIC METABOLIC PNL TOTAL CA: CPT

## 2025-09-14 PROCEDURE — 81001 URINALYSIS AUTO W/SCOPE: CPT

## 2025-09-14 PROCEDURE — 80053 COMPREHEN METABOLIC PANEL: CPT

## 2025-09-14 PROCEDURE — 36415 COLL VENOUS BLD VENIPUNCTURE: CPT

## 2025-09-14 PROCEDURE — 99284 EMERGENCY DEPT VISIT MOD MDM: CPT | Mod: 25

## 2025-09-14 PROCEDURE — 74177 CT ABD & PELVIS W/CONTRAST: CPT

## 2025-09-14 PROCEDURE — 83690 ASSAY OF LIPASE: CPT

## 2025-09-14 PROCEDURE — 96374 THER/PROPH/DIAG INJ IV PUSH: CPT | Mod: XU

## 2025-09-14 PROCEDURE — 87086 URINE CULTURE/COLONY COUNT: CPT

## 2025-09-14 PROCEDURE — 83735 ASSAY OF MAGNESIUM: CPT

## 2025-09-14 RX ORDER — OXYCODONE HYDROCHLORIDE 30 MG/1
1 TABLET ORAL
Qty: 12 | Refills: 0
Start: 2025-09-14 | End: 2025-09-16

## 2025-09-14 RX ORDER — TAMSULOSIN HYDROCHLORIDE 0.4 MG/1
1 CAPSULE ORAL
Qty: 7 | Refills: 0
Start: 2025-09-14 | End: 2025-09-20

## 2025-09-15 LAB
CULTURE RESULTS: NO GROWTH — SIGNIFICANT CHANGE UP
SPECIMEN SOURCE: SIGNIFICANT CHANGE UP